# Patient Record
Sex: FEMALE | Race: WHITE | NOT HISPANIC OR LATINO | URBAN - METROPOLITAN AREA
[De-identification: names, ages, dates, MRNs, and addresses within clinical notes are randomized per-mention and may not be internally consistent; named-entity substitution may affect disease eponyms.]

---

## 2018-05-05 ENCOUNTER — EMERGENCY (EMERGENCY)
Facility: HOSPITAL | Age: 58
LOS: 0 days | Discharge: HOME | End: 2018-05-05
Attending: EMERGENCY MEDICINE | Admitting: EMERGENCY MEDICINE
Payer: OTHER MISCELLANEOUS

## 2018-05-05 VITALS
OXYGEN SATURATION: 100 % | TEMPERATURE: 97 F | HEART RATE: 97 BPM | SYSTOLIC BLOOD PRESSURE: 170 MMHG | DIASTOLIC BLOOD PRESSURE: 75 MMHG | RESPIRATION RATE: 16 BRPM

## 2018-05-05 VITALS — HEIGHT: 68 IN | WEIGHT: 220.02 LBS

## 2018-05-05 DIAGNOSIS — M25.512 PAIN IN LEFT SHOULDER: ICD-10-CM

## 2018-05-05 DIAGNOSIS — S49.82XA OTHER SPECIFIED INJURIES OF LEFT SHOULDER AND UPPER ARM, INITIAL ENCOUNTER: ICD-10-CM

## 2018-05-05 DIAGNOSIS — M75.32 CALCIFIC TENDINITIS OF LEFT SHOULDER: ICD-10-CM

## 2018-05-05 PROCEDURE — 73050 X-RAY EXAM OF SHOULDERS: CPT | Mod: 26,52

## 2018-05-05 PROCEDURE — 99284 EMERGENCY DEPT VISIT MOD MDM: CPT

## 2018-05-05 PROCEDURE — 73030 X-RAY EXAM OF SHOULDER: CPT | Mod: 26,LT

## 2018-05-05 RX ORDER — OXYCODONE AND ACETAMINOPHEN 5; 325 MG/1; MG/1
1 TABLET ORAL ONCE
Qty: 0 | Refills: 0 | Status: DISCONTINUED | OUTPATIENT
Start: 2018-05-05 | End: 2018-05-05

## 2018-05-05 RX ORDER — OXYCODONE HYDROCHLORIDE 5 MG/1
1 TABLET ORAL
Qty: 5 | Refills: 0 | OUTPATIENT
Start: 2018-05-05

## 2018-05-05 RX ADMIN — OXYCODONE AND ACETAMINOPHEN 1 TABLET(S): 5; 325 TABLET ORAL at 20:44

## 2018-05-05 RX ADMIN — Medication 40 MILLIGRAM(S): at 21:29

## 2018-05-05 NOTE — ED STATDOCS - ATTENDING CONTRIBUTION TO CARE
Attending Contribution to Care: I, Yazmin Medina, performed the initial face to face bedside interview with this patient regarding history of present illness, review of symptoms and relevant past medical, social and family history.  I completed an independent physical examination.  I was the initial provider who evaluated this patient. I have signed out the follow up of any pending tests (i.e. labs, radiological studies) to the ACP.  I have communicated the patient’s plan of care and disposition with the ACP.

## 2018-05-05 NOTE — ED STATDOCS - PROGRESS NOTE DETAILS
signed Dot Acosta PA-C Pt seen in intake initially by Dr Medina.   Pt c/o left shoulder pain after mechanical fall yesterday. Seen in urgent care, no fx on xray. Pt still having severe pain in shoulder. limited ROM. xray shows calcific tendonitis. likely exacerbated by fall. sling, outpt f/u ortho, steroids, limited rx oxycodone. Pt feeling well, pt and family agree with DC and plan of care.

## 2018-05-05 NOTE — ED ADULT TRIAGE NOTE - CHIEF COMPLAINT QUOTE
Pt states she fell yesterday in uneven parking lot at work and injured left shoulder. Was seen at urgent care and sent home. Today patient is unable to move left shoulder and has an increase in pain

## 2018-05-05 NOTE — ED STATDOCS - OBJECTIVE STATEMENT
56 y/o female with a no significant PMHx presents to the ED c/o L- shoulder/arm pain s/p mechanical fall yesterday 830am. Pt states she fell yesterday in uneven parking lot at work and injured left shoulder, admits that pain radiates to L-elbow. Was seen at urgent care and sent home able to move shoulder. Today patient is unable to move left shoulder and has an increase in pain. Took ibuprofen which provided mild relief. Denies dizziness, neck pain, or any other acute medical complaints at this time.

## 2019-06-18 ENCOUNTER — APPOINTMENT (OUTPATIENT)
Dept: GASTROENTEROLOGY | Facility: CLINIC | Age: 59
End: 2019-06-18
Payer: COMMERCIAL

## 2019-06-18 ENCOUNTER — TRANSCRIPTION ENCOUNTER (OUTPATIENT)
Age: 59
End: 2019-06-18

## 2019-06-18 VITALS
BODY MASS INDEX: 37.85 KG/M2 | HEART RATE: 69 BPM | DIASTOLIC BLOOD PRESSURE: 79 MMHG | SYSTOLIC BLOOD PRESSURE: 153 MMHG | WEIGHT: 244 LBS | HEIGHT: 67.5 IN

## 2019-06-18 DIAGNOSIS — Z86.010 PERSONAL HISTORY OF COLONIC POLYPS: ICD-10-CM

## 2019-06-18 PROCEDURE — 99202 OFFICE O/P NEW SF 15 MIN: CPT

## 2019-06-18 NOTE — HISTORY OF PRESENT ILLNESS
[de-identified] : 57 yo female for screening colonoscopy. Patient had last colonoscopy in 2016. Patient has a personal history of colon polyps. Patient has no complaints of abdominal pain bowel issues or heartburn. No significant change in medical history.

## 2019-07-12 ENCOUNTER — APPOINTMENT (OUTPATIENT)
Dept: GASTROENTEROLOGY | Facility: AMBULATORY MEDICAL SERVICES | Age: 59
End: 2019-07-12
Payer: COMMERCIAL

## 2019-07-12 PROCEDURE — 45378 DIAGNOSTIC COLONOSCOPY: CPT

## 2019-10-04 ENCOUNTER — RX CHANGE (OUTPATIENT)
Age: 59
End: 2019-10-04

## 2019-10-04 DIAGNOSIS — R10.9 UNSPECIFIED ABDOMINAL PAIN: ICD-10-CM

## 2019-10-10 ENCOUNTER — APPOINTMENT (OUTPATIENT)
Dept: GASTROENTEROLOGY | Facility: CLINIC | Age: 59
End: 2019-10-10
Payer: COMMERCIAL

## 2019-10-10 VITALS
HEART RATE: 68 BPM | SYSTOLIC BLOOD PRESSURE: 149 MMHG | BODY MASS INDEX: 38.09 KG/M2 | DIASTOLIC BLOOD PRESSURE: 92 MMHG | WEIGHT: 237 LBS | HEIGHT: 66 IN

## 2019-10-10 PROCEDURE — 99214 OFFICE O/P EST MOD 30 MIN: CPT

## 2019-10-10 NOTE — ASSESSMENT
[FreeTextEntry1] : 59 yo female with ?postinfectious IBS. Continue Librax. Add Align. Stool specimens. Follow up two weeks to assess response.

## 2019-10-10 NOTE — HISTORY OF PRESENT ILLNESS
[de-identified] : 59 yo female for screening colonoscopy. Patient had last colonoscopy in 2016. Patient has a personal history of colon polyps. Patient has no complaints of abdominal pain bowel issues or heartburn. No significant change in medical history.

## 2019-10-24 ENCOUNTER — APPOINTMENT (OUTPATIENT)
Dept: GASTROENTEROLOGY | Facility: CLINIC | Age: 59
End: 2019-10-24
Payer: COMMERCIAL

## 2019-10-24 VITALS
DIASTOLIC BLOOD PRESSURE: 93 MMHG | WEIGHT: 237 LBS | HEART RATE: 73 BPM | SYSTOLIC BLOOD PRESSURE: 166 MMHG | HEIGHT: 66 IN | BODY MASS INDEX: 38.09 KG/M2

## 2019-10-24 PROCEDURE — 99213 OFFICE O/P EST LOW 20 MIN: CPT

## 2019-10-24 NOTE — PHYSICAL EXAM
[General Appearance - In No Acute Distress] : in no acute distress [General Appearance - Alert] : alert [Heart Rate And Rhythm] : heart rate was normal and rhythm regular [Auscultation Breath Sounds / Voice Sounds] : lungs were clear to auscultation bilaterally [Heart Sounds Gallop] : no gallops [Heart Sounds] : normal S1 and S2 [Murmurs] : no murmurs [Heart Sounds Pericardial Friction Rub] : no pericardial rub [Bowel Sounds] : normal bowel sounds [Abdomen Tenderness] : non-tender [Abdomen Soft] : soft [] : no hepato-splenomegaly [Abdomen Mass (___ Cm)] : no abdominal mass palpated

## 2019-10-24 NOTE — ASSESSMENT
[FreeTextEntry1] : 59 yo female with resolving post infectious IBS. Patient to call if symptoms recur.

## 2019-10-24 NOTE — HISTORY OF PRESENT ILLNESS
[de-identified] : Ms. CHAVEZ UP is a 58 year old female with history of postinfectious IBS. Stool cultures were negative. Patient feels significantly better after starting from. Patient has been taking Librax p.r.n.\par

## 2021-05-27 ENCOUNTER — APPOINTMENT (OUTPATIENT)
Dept: NEUROSURGERY | Facility: CLINIC | Age: 61
End: 2021-05-27
Payer: COMMERCIAL

## 2021-05-27 VITALS
HEIGHT: 66 IN | SYSTOLIC BLOOD PRESSURE: 145 MMHG | BODY MASS INDEX: 37.77 KG/M2 | OXYGEN SATURATION: 100 % | TEMPERATURE: 98.1 F | WEIGHT: 235 LBS | HEART RATE: 65 BPM | DIASTOLIC BLOOD PRESSURE: 85 MMHG

## 2021-05-27 DIAGNOSIS — Q28.3 OTHER MALFORMATIONS OF CEREBRAL VESSELS: ICD-10-CM

## 2021-05-27 PROCEDURE — 99072 ADDL SUPL MATRL&STAF TM PHE: CPT

## 2021-05-27 PROCEDURE — 99203 OFFICE O/P NEW LOW 30 MIN: CPT

## 2021-05-27 RX ORDER — CHLORDIAZEPOXIDE HYDROCHLORIDE AND CLIDINIUM BROMIDE 5; 2.5 MG/1; MG/1
5-2.5 CAPSULE, GELATIN COATED ORAL TWICE DAILY
Qty: 60 | Refills: 3 | Status: DISCONTINUED | COMMUNITY
Start: 2019-10-04 | End: 2021-05-27

## 2021-05-27 RX ORDER — SODIUM PICOSULFATE, MAGNESIUM OXIDE, AND ANHYDROUS CITRIC ACID 10; 3.5; 12 MG/160ML; G/160ML; G/160ML
10-3.5-12 MG-GM LIQUID ORAL
Qty: 1 | Refills: 0 | Status: DISCONTINUED | COMMUNITY
Start: 2019-06-18 | End: 2021-05-27

## 2021-06-01 PROBLEM — Q28.3 VASCULAR ABNORMALITY OF BRAIN: Status: ACTIVE | Noted: 2021-06-01

## 2021-06-01 NOTE — CONSULT LETTER
[Dear  ___] : Dear  [unfilled], [Courtesy Letter:] : I had the pleasure of seeing your patient, [unfilled], in my office today. [Sincerely,] : Sincerely, [FreeTextEntry2] : [place] Soliman MD\par [address]\par [place] [id number] [FreeTextEntry1] : Mrs. Claudio is a very pleasant 60 year-old female patient who was seen in regards to an abnormal MRA finding that was previously noted and followed by another neurosurgeon. \par \par Briefly, the patient had an MRI scan performed in January of 2019 whereupon a 1mm "aneurysm or infundibulum" was noted. The patient has not been symptomatic and does not have a history of thunderclap headaches. The patient does have chronic neck pain described as a feeling of "tightness and pressure" which is intermittent. The patient has not other concerns at this time. \par \par The patient has a history of a basal cell carcinoma removed in 2014. The patient has allergies to sulfa medications and contrast dye. The patient's current medication list includes rosuvastatin, vitamin D, and vitamin B12. \par \par On examination, the patient is alert, oriented, and compliant with the examination. The patient demonstrates 5/5 strength in the upper and lower extremities bilaterally, the patient's cranial nerve examination is grossly normal. The patient has a full neck range motion. \par \par The patient is accompanied with an MRI scan of the cervical spine performed on January 10, 2019. I do not have access to the source images at this time, but the report does not demonstrate any compression of nerve roots or the cervical cord. The patient has 2 MRAs performed in November 27, 2019 as well as November 17, 2020. These images do not demonstrate any change in the 1mm lesion noted at the left internal carotid artery at the junction of the cavernous and supraclinoid junction. \par \par Taken together, the patient has a clinical history and radiographic findings most consistent with an incidentally noted infundibulum which has been stable for several years. At this time, the patient was told by her previous neurosurgeon that no further management or follow up was necessary. Given the stability of the patient's findings over the years, I have reassured the patient that this would be a reasonable approach and that this lesion most likely represents an infundibulum. However, if there are any concerns in the future, we would be happy to reassess the patient and recommend follow up imaging as necessary. From a neck perspective, I have recommended continuing the patient's current conservative therapy regimen and to follow up with us with on an as needed basis.  [FreeTextEntry3] : Dru Donnelly MD, PhD, FRCPSC \par Attending Neurosurgeon \par [facility] \par [address], [address]nd floor \[place] [address] \par Office: [phone] \par Fax: [phone]\par \par

## 2021-07-19 ENCOUNTER — TRANSCRIPTION ENCOUNTER (OUTPATIENT)
Age: 61
End: 2021-07-19

## 2021-08-17 ENCOUNTER — APPOINTMENT (OUTPATIENT)
Dept: NEUROSURGERY | Facility: CLINIC | Age: 61
End: 2021-08-17
Payer: COMMERCIAL

## 2021-08-17 VITALS
TEMPERATURE: 98.4 F | SYSTOLIC BLOOD PRESSURE: 137 MMHG | HEART RATE: 60 BPM | WEIGHT: 230 LBS | OXYGEN SATURATION: 98 % | DIASTOLIC BLOOD PRESSURE: 84 MMHG | HEIGHT: 66 IN | BODY MASS INDEX: 36.96 KG/M2

## 2021-08-17 DIAGNOSIS — M48.02 SPINAL STENOSIS, CERVICAL REGION: ICD-10-CM

## 2021-08-17 PROCEDURE — 99214 OFFICE O/P EST MOD 30 MIN: CPT

## 2021-08-17 NOTE — CONSULT LETTER
[Dear  ___] : Dear  [unfilled], [Courtesy Letter:] : I had the pleasure of seeing your patient, [unfilled], in my office today. [Sincerely,] : Sincerely, [FreeTextEntry2] : [place] Soliman MD\par [address]\[place] [address] \par  [FreeTextEntry1] : Mrs. Claudio is a very pleasant 60-year-old female patient who was seen in office today in follow-up.  The patient recently brought in old images which were reviewed today.\par \par Overall, the patient is doing reasonably well.  The patient had an episode of neck pain a couple of weeks prior which has since resolved almost completely.  The patient states that her neck pain at the time was rated at a 9/10 in severity but is currently rated a 1/10 in severity.  The patient has been managing her pain with conservative therapies well.  The patient was previously assessed in regards to an infundibulum in the cerebral vasculature which was stable over several years.  She was instructed by a cerebral vascular surgeon that no follow-up was necessary.  The patient brought old images from these visits to our office for review and it was noted that the patient had cervical stenosis on the scans.  The patient has not complained of clumsiness or numbness in the upper or lower extremities and thus this visit was simply to inform the patient of her MRI findings.\par \par On examination, the patient is alert, oriented, and compliant with the exam.  The patient demonstrates 5/5 strength in the upper and lower extremities bilaterally.  The patient has brisk reflexes but no evidence of pathologic reflexes such as a Raven sign or ankle clonus.  The patient ambulates well.  The patient can tandem walk.\par \par The patient is accompanied with an MRI scan of the cervical spine dated January 10, 2019.  These images demonstrate moderate degenerative changes in the cervical spine most notably for at C4-C7.  At these levels, there are disc bulges without overt compression of the spinal cord.  Deformation of the spinal cord is seen, but CSF is noted around the spinal cord.  There is a moderate degree of cervical stenosis and foraminal stenosis bilaterally.\par \par Taken together, the patient has a clinical history and radiographic findings most consistent with cervical stenosis without an overt myelopathy.  At this time, the patient is minimally symptomatic with regards to a cervical myelopathy.  I have explained to the patient several symptoms and signs to be cognizant for in the future which could suggest worsening of her clinical situation.  I have also instructed the patient to avoid prolonged extension of the cervical spine and that she is at a very slightly higher risk of a spinal cord injury should she suffer severe trauma to the head and/or neck. The patient will remain vigilant for new symptoms and will contact our office in the future should the need arise. [FreeTextEntry3] : Dru Donnelly MD, PhD, FRCPSC\par Attending Neurosurgeon\par [facility]\par [address]nd floor \[place] [address]\par Office: [phone]\par Fax: [phone]\par

## 2022-04-18 NOTE — ED PROCEDURE NOTE - NS ED PERI VASCULAR NEG
1.  You were seen in the ENT Clinic today by . If you have any questions or concerns after your appointment, please call [phone]. Press option #1 for scheduling related needs. Press option #3 for Nurse advice.    2.   has recommended the following:   - CT of the neck. We will call you with results   - consider speech therapy if bothered by voice    3.  Plan is to return to clinic as needed or sooner with any new or worsening symptoms      Kamille Rivera LPN  [phone]  [facility] Otolaryngology        fingers/toes warm to touch/no paresthesia/no swelling/no cyanosis of extremity/capillary refill time < 2 seconds

## 2022-11-15 ENCOUNTER — APPOINTMENT (OUTPATIENT)
Dept: OBGYN | Facility: CLINIC | Age: 62
End: 2022-11-15

## 2022-11-15 ENCOUNTER — RESULT CHARGE (OUTPATIENT)
Age: 62
End: 2022-11-15

## 2022-11-15 VITALS
BODY MASS INDEX: 38.74 KG/M2 | HEART RATE: 60 BPM | SYSTOLIC BLOOD PRESSURE: 141 MMHG | DIASTOLIC BLOOD PRESSURE: 84 MMHG | WEIGHT: 240 LBS

## 2022-11-15 DIAGNOSIS — R92.2 INCONCLUSIVE MAMMOGRAM: ICD-10-CM

## 2022-11-15 DIAGNOSIS — Z00.00 ENCOUNTER FOR GENERAL ADULT MEDICAL EXAMINATION W/OUT ABNORMAL FINDINGS: ICD-10-CM

## 2022-11-15 LAB
BILIRUB UR QL STRIP: NORMAL
CLARITY UR: CLEAR
COLLECTION METHOD: NORMAL
GLUCOSE UR-MCNC: NORMAL
HCG UR QL: 0.2 EU/DL
HEMOGLOBIN: 14.1
HGB UR QL STRIP.AUTO: NORMAL
KETONES UR-MCNC: NORMAL
LEUKOCYTE ESTERASE UR QL STRIP: NORMAL
NITRITE UR QL STRIP: NORMAL
PH UR STRIP: 6
PROT UR STRIP-MCNC: NORMAL
SP GR UR STRIP: 1.02

## 2022-11-15 PROCEDURE — 99396 PREV VISIT EST AGE 40-64: CPT

## 2022-11-15 PROCEDURE — 85018 HEMOGLOBIN: CPT | Mod: QW

## 2022-11-15 PROCEDURE — 82270 OCCULT BLOOD FECES: CPT

## 2022-11-15 PROCEDURE — 81003 URINALYSIS AUTO W/O SCOPE: CPT | Mod: QW

## 2022-11-17 NOTE — PHYSICAL EXAM
[Chaperone Present] : A chaperone was present in the examining room during all aspects of the physical examination [Appropriately responsive] : appropriately responsive [Alert] : alert [No Lymphadenopathy] : no lymphadenopathy [Soft] : soft [Non-tender] : non-tender [Oriented x3] : oriented x3 [Examination Of The Breasts] : a normal appearance [No Discharge] : no discharge [No Masses] : no breast masses were palpable [Labia Majora] : normal [Labia Minora] : normal [Normal] : normal [Absent] : absent [Uterine Adnexae] : normal [FreeTextEntry1] : Chelsea ARCHULETA-EDER chaperoned during entire physical exam [Occult Blood Positive] : was negative for occult blood analysis

## 2022-11-17 NOTE — HISTORY OF PRESENT ILLNESS
[TextBox_4] : Patient is a 60 y/o female here today for annual visit. Patient denies any GYN complaints at this time. She is s/p total hysterectomy.

## 2022-11-21 LAB — CYTOLOGY CVX/VAG DOC THIN PREP: NORMAL

## 2022-12-22 ENCOUNTER — APPOINTMENT (OUTPATIENT)
Dept: ORTHOPEDIC SURGERY | Facility: CLINIC | Age: 62
End: 2022-12-22

## 2022-12-22 VITALS — BODY MASS INDEX: 37.67 KG/M2 | WEIGHT: 240 LBS | HEIGHT: 67 IN

## 2022-12-22 DIAGNOSIS — E78.00 PURE HYPERCHOLESTEROLEMIA, UNSPECIFIED: ICD-10-CM

## 2022-12-22 PROCEDURE — 20610 DRAIN/INJ JOINT/BURSA W/O US: CPT | Mod: LT

## 2022-12-22 PROCEDURE — J3490M: CUSTOM | Mod: LT

## 2022-12-22 PROCEDURE — 99213 OFFICE O/P EST LOW 20 MIN: CPT | Mod: 25

## 2022-12-22 PROCEDURE — 73564 X-RAY EXAM KNEE 4 OR MORE: CPT | Mod: LT

## 2022-12-22 NOTE — IMAGING
[de-identified] : Examination of the left lower extremity reveals normal neurovascular exam.  Examination left knee reveals a full range of motion with some pain on hyperflexion.  There is lateral joint line pain with equivocal Zena's sign.  There is no palpable swelling over the lateral joint line.  There is some effusion in the joint.  There is no instability or apprehension.  Exam of the left hip is normal with full painless range of motion.

## 2022-12-22 NOTE — DATA REVIEWED
[FreeTextEntry1] : X-rays done in the office today of the left knee 4 views weightbearing show no evidence of arthritis loose bodies tumors masses or calcification.  Normal study

## 2022-12-22 NOTE — HISTORY OF PRESENT ILLNESS
[5] : 5 [3] : 3 [Dull/Aching] : dull/aching [Tightness] : tightness [Constant] : constant [] : yes [FreeTextEntry1] : Lt. Knee [FreeTextEntry3] : 11/2022 [FreeTextEntry5] : 63 y/o F presents for eval. of Lt. knee. Pt states she has hx of cyst and slight tear at the Lt. knee. She states the pain became more persistent within the past month. [FreeTextEntry7] : Lt. Calf

## 2022-12-22 NOTE — PROCEDURE
[FreeTextEntry3] : Under sterile conditions the left knee was injected with 5 cc of quarter percent plain Marcaine; 5 cc of 2% plain lidocaine and 80 mg of Depo-Medrol.  Patient tolerated the procedure well.

## 2022-12-22 NOTE — DISCUSSION/SUMMARY
[de-identified] : Plan at this point is ice and activity modification we will see her back in the office as needed.

## 2023-01-26 ENCOUNTER — APPOINTMENT (OUTPATIENT)
Dept: ORTHOPEDIC SURGERY | Facility: CLINIC | Age: 63
End: 2023-01-26
Payer: COMMERCIAL

## 2023-01-26 VITALS — HEIGHT: 67 IN | WEIGHT: 240 LBS | BODY MASS INDEX: 37.67 KG/M2

## 2023-01-26 DIAGNOSIS — S83.222A PERIPHERAL TEAR OF MEDIAL MENISCUS, CURRENT INJURY, LEFT KNEE, INITIAL ENCOUNTER: ICD-10-CM

## 2023-01-26 PROCEDURE — 99213 OFFICE O/P EST LOW 20 MIN: CPT

## 2023-01-28 PROBLEM — S83.222A PERIPHERAL TEAR OF MEDIAL MENISCUS OF LEFT KNEE AS CURRENT INJURY, INITIAL ENCOUNTER: Status: ACTIVE | Noted: 2023-01-12

## 2023-01-28 NOTE — DISCUSSION/SUMMARY
[de-identified] : Plan at this point is activity modification.  We will try to get her by with conservative treatment.  We will see her back in March for another cortisone injection and decide on whether we should proceed with arthroscopy or not.

## 2023-01-28 NOTE — ASSESSMENT
[FreeTextEntry1] : Patient comes in today for follow-up on her left knee.  The MRI shows a torn lateral meniscus.  The cortisone shot I gave her last time was definitely helpful but seems to have worn off.  Her pain is not severe and mostly bothers her up and down stairs.  Walking seems to be okay.  She has no pain at night.

## 2023-01-28 NOTE — DATA REVIEWED
[FreeTextEntry1] : MRI from [facility] January 11, 2023 shows a complex tear of the lateral meniscus and a peripheral tear of the medial meniscus.  There are mild degenerative changes.

## 2023-01-28 NOTE — IMAGING
[de-identified] : Examination of the left lower extremity reveals normal neurovascular exam.  Examination of the left knee reveals a full range of motion with no effusion.  There is mild lateral joint line pain and equivocal Zena's sign.  There is no medial joint complaints or instability.  There is no redness rashes.

## 2023-01-28 NOTE — HISTORY OF PRESENT ILLNESS
[4] : 4 [1] : 2 [Sharp] : sharp [Intermittent] : intermittent [Household chores] : household chores [Leisure] : leisure [Nothing helps with pain getting better] : Nothing helps with pain getting better [Walking] : walking [Stairs] : stairs [] : no [FreeTextEntry1] : left knee  [FreeTextEntry5] : Pt is here to follow up on MRI results of left knee. CSI from 12/22/22 gave relief. Pain is worse with stairs. Ices when needed.

## 2023-03-20 ENCOUNTER — APPOINTMENT (OUTPATIENT)
Dept: ORTHOPEDIC SURGERY | Facility: CLINIC | Age: 63
End: 2023-03-20
Payer: COMMERCIAL

## 2023-03-20 VITALS — WEIGHT: 240 LBS | HEIGHT: 67 IN | BODY MASS INDEX: 37.67 KG/M2

## 2023-03-20 DIAGNOSIS — Z78.9 OTHER SPECIFIED HEALTH STATUS: ICD-10-CM

## 2023-03-20 DIAGNOSIS — M25.462 EFFUSION, LEFT KNEE: ICD-10-CM

## 2023-03-20 DIAGNOSIS — S83.272A COMPLEX TEAR OF LATERAL MENISCUS, CURRENT INJURY, LEFT KNEE, INITIAL ENCOUNTER: ICD-10-CM

## 2023-03-20 PROCEDURE — 99213 OFFICE O/P EST LOW 20 MIN: CPT | Mod: 25

## 2023-03-20 PROCEDURE — J3490M: CUSTOM | Mod: LT

## 2023-03-20 PROCEDURE — 20610 DRAIN/INJ JOINT/BURSA W/O US: CPT | Mod: LT

## 2023-03-20 RX ORDER — ROSUVASTATIN CALCIUM 5 MG/1
TABLET, FILM COATED ORAL
Refills: 0 | Status: ACTIVE | COMMUNITY

## 2023-03-21 PROBLEM — S83.272A COMPLEX TEAR OF LATERAL MENISCUS OF LEFT KNEE AS CURRENT INJURY, INITIAL ENCOUNTER: Status: ACTIVE | Noted: 2023-01-28

## 2023-03-21 PROBLEM — M25.462 EFFUSION OF LEFT KNEE: Status: ACTIVE | Noted: 2022-12-22

## 2023-03-21 NOTE — DISCUSSION/SUMMARY
[de-identified] : Plan at this time is ice and activity modification we will see her back in the office as needed.

## 2023-03-21 NOTE — HISTORY OF PRESENT ILLNESS
[5] : 5 [Sharp] : sharp [Intermittent] : intermittent [Household chores] : household chores [Leisure] : leisure [Ice] : ice [Walking] : walking [Stairs] : stairs [1] : 1 [Steroid] : Steroid [] : no [FreeTextEntry1] : left knee  [FreeTextEntry5] : 61 y/o F presents for CSI Lt. Knee. Previous tx - CSI 1/26/2023 [FreeTextEntry9] : Brace [de-identified] : 1/26/2023 [de-identified] : Dr. Chen [de-identified] : CSI  [de-identified] : 01/26/2023 [de-identified] : Lt. Knee

## 2023-03-21 NOTE — ASSESSMENT
[FreeTextEntry1] : Patient comes in today follow-up on her left knee.  She had a flareup of the left knee which she has been noticing a lateral meniscus tear.  She has pain over the lateral joint line and difficulty squatting and pivoting.  She has occasional pain at night.

## 2023-03-21 NOTE — IMAGING
[de-identified] : Examination of the left lower extremity reveals normal neurovascular exam.  Examination left knee reveals a full range of motion with a trace effusion.  There is lateral joint line tenderness with equivocal Zena's sign.  There is no medial joint complaints.  There is no instability or apprehension.  There is no redness rashes or visible scars.  Examination of the left hip is normal with full painless range of motion.

## 2023-07-17 ENCOUNTER — NON-APPOINTMENT (OUTPATIENT)
Age: 63
End: 2023-07-17

## 2023-09-14 ENCOUNTER — APPOINTMENT (OUTPATIENT)
Dept: GASTROENTEROLOGY | Facility: CLINIC | Age: 63
End: 2023-09-14
Payer: COMMERCIAL

## 2023-09-14 DIAGNOSIS — K58.9 IRRITABLE BOWEL SYNDROME W/OUT DIARRHEA: ICD-10-CM

## 2023-09-14 DIAGNOSIS — Z12.11 ENCOUNTER FOR SCREENING FOR MALIGNANT NEOPLASM OF COLON: ICD-10-CM

## 2023-09-14 PROCEDURE — 99203 OFFICE O/P NEW LOW 30 MIN: CPT

## 2023-11-21 ENCOUNTER — RESULT CHARGE (OUTPATIENT)
Age: 63
End: 2023-11-21

## 2023-11-21 ENCOUNTER — APPOINTMENT (OUTPATIENT)
Dept: OBGYN | Facility: CLINIC | Age: 63
End: 2023-11-21
Payer: COMMERCIAL

## 2023-11-21 VITALS
DIASTOLIC BLOOD PRESSURE: 78 MMHG | SYSTOLIC BLOOD PRESSURE: 147 MMHG | HEIGHT: 67 IN | BODY MASS INDEX: 37.04 KG/M2 | HEART RATE: 63 BPM | WEIGHT: 236 LBS

## 2023-11-21 LAB
BILIRUB UR QL STRIP: NORMAL
CLARITY UR: CLEAR
COLLECTION METHOD: NORMAL
DATE COLLECTED: NORMAL
GLUCOSE UR-MCNC: NORMAL
HCG UR QL: 0.2 EU/DL
HEMOCCULT SP1 STL QL: NEGATIVE
HEMOGLOBIN: 12.1
HGB UR QL STRIP.AUTO: NORMAL
KETONES UR-MCNC: NORMAL
LEUKOCYTE ESTERASE UR QL STRIP: NORMAL
NITRITE UR QL STRIP: NORMAL
PH UR STRIP: 6
PROT UR STRIP-MCNC: NORMAL
QUALITY CONTROL: YES
SP GR UR STRIP: 1.02

## 2023-11-21 PROCEDURE — 85018 HEMOGLOBIN: CPT | Mod: QW

## 2023-11-21 PROCEDURE — 99396 PREV VISIT EST AGE 40-64: CPT

## 2023-11-27 ENCOUNTER — NON-APPOINTMENT (OUTPATIENT)
Age: 63
End: 2023-11-27

## 2023-11-27 LAB — CYTOLOGY CVX/VAG DOC THIN PREP: NORMAL

## 2023-11-28 ENCOUNTER — TRANSCRIPTION ENCOUNTER (OUTPATIENT)
Age: 63
End: 2023-11-28

## 2023-11-29 ENCOUNTER — TRANSCRIPTION ENCOUNTER (OUTPATIENT)
Age: 63
End: 2023-11-29

## 2023-11-29 RX ORDER — SODIUM PICOSULFATE, MAGNESIUM OXIDE, AND ANHYDROUS CITRIC ACID 10; 3.5; 12 MG/160ML; G/160ML; G/160ML
10-3.5-12 MG-GM LIQUID ORAL
Qty: 320 | Refills: 0 | Status: ACTIVE | COMMUNITY
Start: 2023-11-29 | End: 1900-01-01

## 2024-01-30 ENCOUNTER — APPOINTMENT (OUTPATIENT)
Dept: GASTROENTEROLOGY | Facility: AMBULATORY MEDICAL SERVICES | Age: 64
End: 2024-01-30
Payer: COMMERCIAL

## 2024-01-30 PROCEDURE — 45378 DIAGNOSTIC COLONOSCOPY: CPT | Mod: GC

## 2024-04-01 ENCOUNTER — OUTPATIENT (OUTPATIENT)
Dept: OUTPATIENT SERVICES | Facility: HOSPITAL | Age: 64
LOS: 1 days | End: 2024-04-01
Payer: COMMERCIAL

## 2024-04-01 ENCOUNTER — APPOINTMENT (OUTPATIENT)
Dept: CT IMAGING | Facility: CLINIC | Age: 64
End: 2024-04-01
Payer: COMMERCIAL

## 2024-04-01 DIAGNOSIS — Z00.8 ENCOUNTER FOR OTHER GENERAL EXAMINATION: ICD-10-CM

## 2024-04-01 DIAGNOSIS — E78.2 MIXED HYPERLIPIDEMIA: ICD-10-CM

## 2024-04-01 PROCEDURE — 75571 CT HRT W/O DYE W/CA TEST: CPT

## 2024-04-01 PROCEDURE — 75571 CT HRT W/O DYE W/CA TEST: CPT | Mod: 26

## 2024-11-27 ENCOUNTER — APPOINTMENT (OUTPATIENT)
Dept: OBGYN | Facility: CLINIC | Age: 64
End: 2024-11-27

## 2024-11-27 VITALS
SYSTOLIC BLOOD PRESSURE: 147 MMHG | BODY MASS INDEX: 36.1 KG/M2 | HEART RATE: 73 BPM | WEIGHT: 230 LBS | HEIGHT: 67 IN | DIASTOLIC BLOOD PRESSURE: 78 MMHG

## 2024-11-27 DIAGNOSIS — Z12.39 ENCOUNTER FOR OTHER SCREENING FOR MALIGNANT NEOPLASM OF BREAST: ICD-10-CM

## 2024-11-27 DIAGNOSIS — Z12.4 ENCOUNTER FOR SCREENING FOR MALIGNANT NEOPLASM OF CERVIX: ICD-10-CM

## 2024-11-27 DIAGNOSIS — Z13.820 ENCOUNTER FOR SCREENING FOR OSTEOPOROSIS: ICD-10-CM

## 2024-11-27 DIAGNOSIS — Z00.00 ENCOUNTER FOR GENERAL ADULT MEDICAL EXAMINATION W/OUT ABNORMAL FINDINGS: ICD-10-CM

## 2024-11-27 DIAGNOSIS — Z12.11 ENCOUNTER FOR SCREENING FOR MALIGNANT NEOPLASM OF COLON: ICD-10-CM

## 2024-11-27 DIAGNOSIS — Z01.419 ENCOUNTER FOR GYNECOLOGICAL EXAMINATION (GENERAL) (ROUTINE) W/OUT ABNORMAL FINDINGS: ICD-10-CM

## 2024-11-27 LAB
BILIRUB UR QL STRIP: NORMAL
CLARITY UR: CLEAR
COLLECTION METHOD: NORMAL
DATE COLLECTED: NORMAL
GLUCOSE UR-MCNC: NORMAL
HCG UR QL: 0.2 EU/DL
HEMOCCULT SP1 STL QL: NEGATIVE
HEMOGLOBIN: 14.7
HGB UR QL STRIP.AUTO: NORMAL
KETONES UR-MCNC: NORMAL
LEUKOCYTE ESTERASE UR QL STRIP: NORMAL
NITRITE UR QL STRIP: NORMAL
PH UR STRIP: 7
PROT UR STRIP-MCNC: NORMAL
QUALITY CONTROL: YES
SP GR UR STRIP: 1.01

## 2024-11-27 PROCEDURE — 85018 HEMOGLOBIN: CPT | Mod: QW

## 2024-11-27 PROCEDURE — 99396 PREV VISIT EST AGE 40-64: CPT

## 2024-11-27 PROCEDURE — 99459 PELVIC EXAMINATION: CPT

## 2024-11-27 PROCEDURE — 81003 URINALYSIS AUTO W/O SCOPE: CPT | Mod: QW

## 2024-11-27 PROCEDURE — 82270 OCCULT BLOOD FECES: CPT

## 2024-12-03 ENCOUNTER — NON-APPOINTMENT (OUTPATIENT)
Age: 64
End: 2024-12-03

## 2024-12-03 LAB — CYTOLOGY CVX/VAG DOC THIN PREP: NORMAL

## 2024-12-17 ENCOUNTER — NON-APPOINTMENT (OUTPATIENT)
Age: 64
End: 2024-12-17